# Patient Record
Sex: FEMALE | URBAN - NONMETROPOLITAN AREA
[De-identification: names, ages, dates, MRNs, and addresses within clinical notes are randomized per-mention and may not be internally consistent; named-entity substitution may affect disease eponyms.]

---

## 2024-04-08 ENCOUNTER — APPOINTMENT (OUTPATIENT)
Dept: URBAN - NONMETROPOLITAN AREA SURGERY 8 | Age: 69
Setting detail: DERMATOLOGY
End: 2024-04-09

## 2024-04-08 DIAGNOSIS — D18.0 HEMANGIOMA: ICD-10-CM

## 2024-04-08 DIAGNOSIS — D485 NEOPLASM OF UNCERTAIN BEHAVIOR OF SKIN: ICD-10-CM

## 2024-04-08 DIAGNOSIS — L57.0 ACTINIC KERATOSIS: ICD-10-CM

## 2024-04-08 DIAGNOSIS — L72.0 EPIDERMAL CYST: ICD-10-CM

## 2024-04-08 DIAGNOSIS — D22 MELANOCYTIC NEVI: ICD-10-CM

## 2024-04-08 DIAGNOSIS — L82.1 OTHER SEBORRHEIC KERATOSIS: ICD-10-CM

## 2024-04-08 DIAGNOSIS — L57.8 OTHER SKIN CHANGES DUE TO CHRONIC EXPOSURE TO NONIONIZING RADIATION: ICD-10-CM

## 2024-04-08 DIAGNOSIS — L92.0 GRANULOMA ANNULARE: ICD-10-CM

## 2024-04-08 DIAGNOSIS — Z71.89 OTHER SPECIFIED COUNSELING: ICD-10-CM

## 2024-04-08 PROBLEM — D22.9 MELANOCYTIC NEVI, UNSPECIFIED: Status: ACTIVE | Noted: 2024-04-08

## 2024-04-08 PROBLEM — D18.01 HEMANGIOMA OF SKIN AND SUBCUTANEOUS TISSUE: Status: ACTIVE | Noted: 2024-04-08

## 2024-04-08 PROBLEM — D48.5 NEOPLASM OF UNCERTAIN BEHAVIOR OF SKIN: Status: ACTIVE | Noted: 2024-04-08

## 2024-04-08 PROCEDURE — OTHER EXTRACTIONS: OTHER

## 2024-04-08 PROCEDURE — 11102 TANGNTL BX SKIN SINGLE LES: CPT

## 2024-04-08 PROCEDURE — OTHER LIQUID NITROGEN: OTHER

## 2024-04-08 PROCEDURE — 17000 DESTRUCT PREMALG LESION: CPT | Mod: 59

## 2024-04-08 PROCEDURE — 17003 DESTRUCT PREMALG LES 2-14: CPT

## 2024-04-08 PROCEDURE — OTHER BIOPSY BY SHAVE METHOD: OTHER

## 2024-04-08 PROCEDURE — OTHER COUNSELING: OTHER

## 2024-04-08 PROCEDURE — OTHER PRESCRIPTION: OTHER

## 2024-04-08 PROCEDURE — OTHER MIPS QUALITY: OTHER

## 2024-04-08 PROCEDURE — 99214 OFFICE O/P EST MOD 30 MIN: CPT | Mod: 25

## 2024-04-08 RX ORDER — TRIAMCINOLONE ACETONIDE 0.25 MG/G
CREAM TOPICAL
Qty: 80 | Refills: 3 | Status: ERX | COMMUNITY
Start: 2024-04-08

## 2024-04-08 ASSESSMENT — LOCATION DETAILED DESCRIPTION DERM
LOCATION DETAILED: LEFT BUTTOCK
LOCATION DETAILED: LEFT LATERAL CANTHUS
LOCATION DETAILED: PHILTRUM
LOCATION DETAILED: LEFT DISTAL CALF
LOCATION DETAILED: RIGHT SUPERIOR HELIX
LOCATION DETAILED: LEFT LATERAL SUPERIOR EYELID

## 2024-04-08 ASSESSMENT — LOCATION ZONE DERM
LOCATION ZONE: LEG
LOCATION ZONE: LIP
LOCATION ZONE: TRUNK
LOCATION ZONE: EAR
LOCATION ZONE: EYELID

## 2024-04-08 ASSESSMENT — LOCATION SIMPLE DESCRIPTION DERM
LOCATION SIMPLE: RIGHT EAR
LOCATION SIMPLE: LEFT EYELID
LOCATION SIMPLE: LEFT SUPERIOR EYELID
LOCATION SIMPLE: LEFT BUTTOCK
LOCATION SIMPLE: UPPER LIP
LOCATION SIMPLE: LEFT CALF

## 2024-04-08 NOTE — PROCEDURE: LIQUID NITROGEN
Render Post-Care Instructions In Note?: no
Show Aperture Variable?: Yes
Post-Care Instructions: I reviewed with the patient in detail post-care instructions. Patient is to wear sunprotection, and avoid picking at any of the treated lesions. Pt may apply Vaseline to crusted or scabbing areas.
Duration Of Freeze Thaw-Cycle (Seconds): 10
Consent: The patient's consent was obtained including but not limited to risks of crusting, scabbing, blistering, scarring, darker or lighter pigmentary change, recurrence, incomplete removal and infection.
Number Of Freeze-Thaw Cycles: 1 freeze-thaw cycle
Detail Level: Detailed

## 2024-04-08 NOTE — PROCEDURE: EXTRACTIONS
Render Number Of Lesions Treated: no
Detail Level: Detailed
Post-Care Instructions: I reviewed with the patient in detail post-care instructions. Patient is to keep the treatment areas dry overnight, and then apply bacitracin twice daily until healed. Patient may apply hydrogen peroxide soaks to remove any crusting.
Prep Text (Optional): Prior to removal the treatment areas were prepped in the usual fashion.
Acne Type: Comedonal Lesions
Consent was obtained and risks were reviewed including but not limited to scarring, infection, bleeding, scabbing, incomplete removal, and allergy to anesthesia.
Extraction Method: 11 blade and comedo extractor

## 2024-04-08 NOTE — PROCEDURE: BIOPSY BY SHAVE METHOD
Body Location Override (Optional - Billing Will Still Be Based On Selected Body Map Location If Applicable): left lateral leg
Detail Level: Detailed
Depth Of Biopsy: dermis
Was A Bandage Applied: Yes
Size Of Lesion In Cm: 0
Biopsy Type: H and E
Biopsy Method: Dermablade
Anesthesia Type: 1% lidocaine with epinephrine
Anesthesia Volume In Cc: 0.5
Hemostasis: Drysol
Wound Care: Petrolatum
Dressing: bandage
Destruction After The Procedure: No
Type Of Destruction Used: Curettage
Curettage Text: The wound bed was treated with curettage after the biopsy was performed.
Cryotherapy Text: The wound bed was treated with cryotherapy after the biopsy was performed.
Electrodesiccation Text: The wound bed was treated with electrodesiccation after the biopsy was performed.
Electrodesiccation And Curettage Text: The wound bed was treated with electrodesiccation and curettage after the biopsy was performed.
Silver Nitrate Text: The wound bed was treated with silver nitrate after the biopsy was performed.
Lab: [id number]
Consent: Written consent was obtained and risks were reviewed including but not limited to scarring, infection, bleeding, scabbing, incomplete removal, nerve damage and allergy to anesthesia.
Post-Care Instructions: I reviewed with the patient in detail post-care instructions. Patient is to keep the biopsy site dry overnight, and then apply bacitracin twice daily until healed. Patient may apply hydrogen peroxide soaks to remove any crusting.
Notification Instructions: Patient will be notified of biopsy results. However, patient instructed to call the office if not contacted within 2 weeks.
Billing Type: Third-Party Bill
Information: Selecting Yes will display possible errors in your note based on the variables you have selected. This validation is only offered as a suggestion for you. PLEASE NOTE THAT THE VALIDATION TEXT WILL BE REMOVED WHEN YOU FINALIZE YOUR NOTE. IF YOU WANT TO FAX A PRELIMINARY NOTE YOU WILL NEED TO TOGGLE THIS TO 'NO' IF YOU DO NOT WANT IT IN YOUR FAXED NOTE.

## 2024-08-08 ENCOUNTER — RX ONLY (RX ONLY)
Age: 69
End: 2024-08-08

## 2024-08-08 ENCOUNTER — APPOINTMENT (OUTPATIENT)
Dept: URBAN - NONMETROPOLITAN AREA SURGERY 8 | Age: 69
Setting detail: DERMATOLOGY
End: 2024-08-16

## 2024-08-08 DIAGNOSIS — L92.0 GRANULOMA ANNULARE: ICD-10-CM

## 2024-08-08 DIAGNOSIS — D22 MELANOCYTIC NEVI: ICD-10-CM

## 2024-08-08 PROBLEM — D22.72 MELANOCYTIC NEVI OF LEFT LOWER LIMB, INCLUDING HIP: Status: ACTIVE | Noted: 2024-08-08

## 2024-08-08 PROCEDURE — OTHER COUNSELING: OTHER

## 2024-08-08 PROCEDURE — OTHER ADDITIONAL NOTES: OTHER

## 2024-08-08 PROCEDURE — OTHER MIPS QUALITY: OTHER

## 2024-08-08 PROCEDURE — 99213 OFFICE O/P EST LOW 20 MIN: CPT

## 2024-08-08 RX ORDER — TRIAMCINOLONE ACETONIDE 0.25 MG/G
CREAM TOPICAL
Qty: 80 | Refills: 3 | Status: ERX

## 2024-08-08 ASSESSMENT — LOCATION ZONE DERM: LOCATION ZONE: LEG

## 2024-08-08 ASSESSMENT — LOCATION DETAILED DESCRIPTION DERM
LOCATION DETAILED: LEFT ANTERIOR PROXIMAL THIGH
LOCATION DETAILED: LEFT LATERAL DISTAL PRETIBIAL REGION

## 2024-08-08 ASSESSMENT — LOCATION SIMPLE DESCRIPTION DERM
LOCATION SIMPLE: LEFT THIGH
LOCATION SIMPLE: LEFT PRETIBIAL REGION

## 2024-08-08 NOTE — HPI: OTHER
Condition:: Junctional nevus with moderate atypia
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 \\n\\nPatient also has biopsy proven granuloma annulare and she has an area on the left hip that is flaring despite the use of triamcinolone cream.

## 2024-08-08 NOTE — PROCEDURE: ADDITIONAL NOTES
Additional Notes: Accession: [id number] biopsied 4/7/24 no evidence of repigmentation.\\nRTC for fbse q 6 months.
Detail Level: Simple
Render Risk Assessment In Note?: no
Additional Notes: There is no inflammation present so will dc TAC. Recommend pramoxine for anti-itch.

## 2025-04-09 ENCOUNTER — APPOINTMENT (OUTPATIENT)
Dept: URBAN - NONMETROPOLITAN AREA SURGERY 8 | Age: 70
Setting detail: DERMATOLOGY
End: 2025-04-11

## 2025-04-09 DIAGNOSIS — L57.8 OTHER SKIN CHANGES DUE TO CHRONIC EXPOSURE TO NONIONIZING RADIATION: ICD-10-CM

## 2025-04-09 DIAGNOSIS — K11.6 MUCOCELE OF SALIVARY GLAND: ICD-10-CM

## 2025-04-09 DIAGNOSIS — B00.1 HERPESVIRAL VESICULAR DERMATITIS: ICD-10-CM

## 2025-04-09 DIAGNOSIS — L72.8 OTHER FOLLICULAR CYSTS OF THE SKIN AND SUBCUTANEOUS TISSUE: ICD-10-CM

## 2025-04-09 PROCEDURE — 11900 INJECT SKIN LESIONS </W 7: CPT

## 2025-04-09 PROCEDURE — OTHER INTRALESIONAL KENALOG: OTHER

## 2025-04-09 PROCEDURE — OTHER COUNSELING: OTHER

## 2025-04-09 PROCEDURE — OTHER MIPS QUALITY: OTHER

## 2025-04-09 PROCEDURE — OTHER ADDITIONAL NOTES: OTHER

## 2025-04-09 PROCEDURE — OTHER PRESCRIPTION MEDICATION MANAGEMENT: OTHER

## 2025-04-09 PROCEDURE — OTHER OTC TREATMENT REGIMEN: OTHER

## 2025-04-09 PROCEDURE — 99213 OFFICE O/P EST LOW 20 MIN: CPT | Mod: 25

## 2025-04-09 PROCEDURE — OTHER PRESCRIPTION: OTHER

## 2025-04-09 RX ORDER — CREAM BASE NO.171
CREAM (GRAM) MISCELLANEOUS
Qty: 30 | Refills: 12 | Status: ERX | COMMUNITY
Start: 2025-04-09

## 2025-04-09 ASSESSMENT — LOCATION ZONE DERM
LOCATION ZONE: LIP
LOCATION ZONE: AXILLAE

## 2025-04-09 ASSESSMENT — LOCATION SIMPLE DESCRIPTION DERM
LOCATION SIMPLE: RIGHT INFERIOR MUCOSAL LIP
LOCATION SIMPLE: RIGHT AXILLARY VAULT

## 2025-04-09 ASSESSMENT — LOCATION DETAILED DESCRIPTION DERM
LOCATION DETAILED: RIGHT INFERIOR MUCOSAL LIP
LOCATION DETAILED: RIGHT AXILLARY VAULT

## 2025-04-09 NOTE — PROCEDURE: OTC TREATMENT REGIMEN
Detail Level: Zone
Patient Specific Otc Recommendations (Will Not Stick From Patient To Patient): Obagi vitamin C serum

## 2025-04-09 NOTE — PROCEDURE: ADDITIONAL NOTES
Additional Notes: We discussed excision and she may call for appointment. area is not inflamed, and she will decide if she would like it removed
Detail Level: Simple
Render Risk Assessment In Note?: no
Additional Notes: She knows to rtc if lesion grows or changes
Additional Notes: also with small focal area of scar

## 2025-04-09 NOTE — PROCEDURE: PRESCRIPTION MEDICATION MANAGEMENT
Initiate Treatment: She will start valtrex prophylactically 500mg bid now and then 2 more 12 hours later
Render In Strict Bullet Format?: No
Detail Level: Zone

## 2025-04-09 NOTE — PROCEDURE: INTRALESIONAL KENALOG
Consent: The risks of atrophy were reviewed with the patient.
Lot # For Kenalog (Optional): [id number]
Include Z78.9 (Other Specified Conditions Influencing Health Status) As An Associated Diagnosis?: No
Kenalog Preparation: Kenalog
Expiration Date For Kenalog (Optional): may 2027
Which Kenalog Vial Was Used?: Kenalog 10 mg/ml (5 ml vial)
Concentration Of Kenalog Solution Injected (Mg/Ml): 5.0
How Many Mls Were Removed From The 40 Mg/Ml (5ml) Vial When Preparing The Injectable Solution?: 0
Show Inventory Tab: Hide
Validate Note Data When Using Inventory: Yes
Total Volume (Ccs): 0.2
Kenalog Type Of Vial: Multiple Dose
Medical Necessity Clause: This procedure was medically necessary because the lesions that were treated were:
Administered By (Optional): MIRLANDE
Detail Level: Detailed

## 2025-05-20 RX ORDER — CREAM BASE NO.171
CREAM (GRAM) MISCELLANEOUS
Qty: 30 | Refills: 12 | Status: ERX

## 2025-07-02 ENCOUNTER — APPOINTMENT (OUTPATIENT)
Dept: URBAN - NONMETROPOLITAN AREA SURGERY 8 | Age: 70
Setting detail: DERMATOLOGY
End: 2025-07-08

## 2025-07-02 DIAGNOSIS — L92.0 GRANULOMA ANNULARE: ICD-10-CM

## 2025-07-02 DIAGNOSIS — B00.1 HERPESVIRAL VESICULAR DERMATITIS: ICD-10-CM

## 2025-07-02 DIAGNOSIS — L82.1 OTHER SEBORRHEIC KERATOSIS: ICD-10-CM

## 2025-07-02 DIAGNOSIS — L57.8 OTHER SKIN CHANGES DUE TO CHRONIC EXPOSURE TO NONIONIZING RADIATION: ICD-10-CM

## 2025-07-02 DIAGNOSIS — Z71.89 OTHER SPECIFIED COUNSELING: ICD-10-CM

## 2025-07-02 DIAGNOSIS — L72.8 OTHER FOLLICULAR CYSTS OF THE SKIN AND SUBCUTANEOUS TISSUE: ICD-10-CM

## 2025-07-02 DIAGNOSIS — D18.0 HEMANGIOMA: ICD-10-CM

## 2025-07-02 DIAGNOSIS — L57.0 ACTINIC KERATOSIS: ICD-10-CM

## 2025-07-02 DIAGNOSIS — D22 MELANOCYTIC NEVI: ICD-10-CM

## 2025-07-02 DIAGNOSIS — Z87.2 PERSONAL HISTORY OF DISEASES OF THE SKIN AND SUBCUTANEOUS TISSUE: ICD-10-CM

## 2025-07-02 DIAGNOSIS — D485 NEOPLASM OF UNCERTAIN BEHAVIOR OF SKIN: ICD-10-CM

## 2025-07-02 PROBLEM — D48.5 NEOPLASM OF UNCERTAIN BEHAVIOR OF SKIN: Status: ACTIVE | Noted: 2025-07-02

## 2025-07-02 PROBLEM — D22.5 MELANOCYTIC NEVI OF TRUNK: Status: ACTIVE | Noted: 2025-07-02

## 2025-07-02 PROBLEM — D18.01 HEMANGIOMA OF SKIN AND SUBCUTANEOUS TISSUE: Status: ACTIVE | Noted: 2025-07-02

## 2025-07-02 PROCEDURE — OTHER MIPS QUALITY: OTHER

## 2025-07-02 PROCEDURE — 17003 DESTRUCT PREMALG LES 2-14: CPT

## 2025-07-02 PROCEDURE — 99213 OFFICE O/P EST LOW 20 MIN: CPT | Mod: 25

## 2025-07-02 PROCEDURE — OTHER LIQUID NITROGEN: OTHER

## 2025-07-02 PROCEDURE — OTHER ADDITIONAL NOTES: OTHER

## 2025-07-02 PROCEDURE — 17000 DESTRUCT PREMALG LESION: CPT

## 2025-07-02 PROCEDURE — OTHER COUNSELING: OTHER

## 2025-07-02 ASSESSMENT — LOCATION ZONE DERM
LOCATION ZONE: AXILLAE
LOCATION ZONE: LEG
LOCATION ZONE: ARM
LOCATION ZONE: TRUNK

## 2025-07-02 ASSESSMENT — LOCATION DETAILED DESCRIPTION DERM
LOCATION DETAILED: RIGHT AXILLARY VAULT
LOCATION DETAILED: LEFT POSTERIOR LATERAL DISTAL THIGH
LOCATION DETAILED: LEFT PROXIMAL DORSAL FOREARM
LOCATION DETAILED: LEFT DISTAL DORSAL FOREARM
LOCATION DETAILED: RIGHT LATERAL BUTTOCK
LOCATION DETAILED: LEFT BUTTOCK
LOCATION DETAILED: LEFT SUPERIOR MEDIAL UPPER BACK
LOCATION DETAILED: INFERIOR THORACIC SPINE

## 2025-07-02 ASSESSMENT — LOCATION SIMPLE DESCRIPTION DERM
LOCATION SIMPLE: RIGHT BUTTOCK
LOCATION SIMPLE: LEFT THIGH
LOCATION SIMPLE: LEFT FOREARM
LOCATION SIMPLE: LEFT UPPER BACK
LOCATION SIMPLE: LEFT BUTTOCK
LOCATION SIMPLE: UPPER BACK
LOCATION SIMPLE: RIGHT AXILLARY VAULT